# Patient Record
Sex: FEMALE | Race: ASIAN | NOT HISPANIC OR LATINO
[De-identification: names, ages, dates, MRNs, and addresses within clinical notes are randomized per-mention and may not be internally consistent; named-entity substitution may affect disease eponyms.]

---

## 2020-12-23 ENCOUNTER — RESULT REVIEW (OUTPATIENT)
Age: 40
End: 2020-12-23

## 2021-01-27 ENCOUNTER — APPOINTMENT (OUTPATIENT)
Dept: ENDOCRINOLOGY | Facility: CLINIC | Age: 41
End: 2021-01-27
Payer: COMMERCIAL

## 2021-01-27 VITALS
OXYGEN SATURATION: 99 % | DIASTOLIC BLOOD PRESSURE: 80 MMHG | HEART RATE: 105 BPM | SYSTOLIC BLOOD PRESSURE: 110 MMHG | HEIGHT: 62 IN | TEMPERATURE: 98.5 F | WEIGHT: 153 LBS | BODY MASS INDEX: 28.16 KG/M2

## 2021-01-27 DIAGNOSIS — Z83.49 FAMILY HISTORY OF OTHER ENDOCRINE, NUTRITIONAL AND METABOLIC DISEASES: ICD-10-CM

## 2021-01-27 DIAGNOSIS — Z78.9 OTHER SPECIFIED HEALTH STATUS: ICD-10-CM

## 2021-01-27 PROCEDURE — 99244 OFF/OP CNSLTJ NEW/EST MOD 40: CPT | Mod: 25

## 2021-01-27 PROCEDURE — 36415 COLL VENOUS BLD VENIPUNCTURE: CPT

## 2021-01-27 PROCEDURE — 99072 ADDL SUPL MATRL&STAF TM PHE: CPT

## 2021-02-02 ENCOUNTER — NON-APPOINTMENT (OUTPATIENT)
Age: 41
End: 2021-02-02

## 2021-02-15 NOTE — ADDENDUM
[FreeTextEntry1] : Ms. Chen was encouraged to continue close follow up with Dr. Egan and I will keep Dr. Jignesh márquez along the way.

## 2021-02-15 NOTE — PHYSICAL EXAM

## 2021-02-15 NOTE — HISTORY OF PRESENT ILLNESS
[FreeTextEntry1] : Ms. JAY  is a 40 year  year old female  who presents for initial endocrine evaluation.She presents via the courtesy of Dr. Judi Egan She presents with regard to a history of hypothyroidism. She is pregnant and was told that she is hypothyroid in this pregnancy . With her first child-almsot age 6 -told tsh not optimal for preg-put on LT4 in that preg-50 mcg-stopped the LT4 aftter delivery. Currently, she found out she is  preg in toward end of November and on her own started back on  L4 around end of December- toward the 11th week when TSH was  noted to be suboptimal.She is not sure of the value of the original tsh from several weeks ago.She has of late been taking the LT4,  50 mcg,  for about one month. Has a 9 year old too- both children born by  section.For prior preg had iui-this preg conceived naturally.\par Is on one asa given chronological age.  Too is on Prenatal vitamin-advised to take her LT4 away from her prenatal.\par Due date 08/04/2021\par Has noted mild intermittent fatigue.\par FH Mom just dx with hypoth at 64.\par \par \par

## 2021-03-04 ENCOUNTER — APPOINTMENT (OUTPATIENT)
Dept: ENDOCRINOLOGY | Facility: CLINIC | Age: 41
End: 2021-03-04
Payer: COMMERCIAL

## 2021-03-04 VITALS
HEART RATE: 98 BPM | DIASTOLIC BLOOD PRESSURE: 62 MMHG | WEIGHT: 157 LBS | OXYGEN SATURATION: 98 % | SYSTOLIC BLOOD PRESSURE: 118 MMHG | RESPIRATION RATE: 16 BRPM | BODY MASS INDEX: 28.72 KG/M2

## 2021-03-04 DIAGNOSIS — O99.280 ENDOCRINE, NUTRITIONAL AND METABOLIC DISEASES COMPLICATING PREGNANCY, UNSPECIFIED TRIMESTER: ICD-10-CM

## 2021-03-04 DIAGNOSIS — E05.90 ENDOCRINE, NUTRITIONAL AND METABOLIC DISEASES COMPLICATING PREGNANCY, UNSPECIFIED TRIMESTER: ICD-10-CM

## 2021-03-04 DIAGNOSIS — E55.9 VITAMIN D DEFICIENCY, UNSPECIFIED: ICD-10-CM

## 2021-03-04 DIAGNOSIS — O26.819 PREGNANCY RELATED EXHAUSTION AND FATIGUE, UNSPECIFIED TRIMESTER: ICD-10-CM

## 2021-03-04 DIAGNOSIS — E03.9 HYPOTHYROIDISM, UNSPECIFIED: ICD-10-CM

## 2021-03-04 PROCEDURE — 36415 COLL VENOUS BLD VENIPUNCTURE: CPT

## 2021-03-04 PROCEDURE — 99072 ADDL SUPL MATRL&STAF TM PHE: CPT

## 2021-03-04 PROCEDURE — 99214 OFFICE O/P EST MOD 30 MIN: CPT | Mod: 25

## 2021-03-07 PROBLEM — O99.280 MATERNAL HYPERTHYROIDISM: Status: ACTIVE | Noted: 2021-01-27

## 2021-03-07 NOTE — HISTORY OF PRESENT ILLNESS
[FreeTextEntry1] : Ms. JAY  is a 40 year  year old female  who returns  with regard to a history of hypothyroidism in pregnancy. Now 18 weeks gestation. She has of late been taking LT4,  50 mcg. Has a 9 year old too- both children born by C- section.For prior preg had iui-this preg conceived naturally.\par Is on one aspirin given chronological age.  Too is on Prenatal vitamin-advised to take her LT4 away from her prenatal.\par Has noted mild intermittent fatigue.\par D# low-now on D3 50,000 iu weekly.\par EDC: 08/04/2021 \par Feels well.\par Pt is not considering receiving COVID vaccine during pregnancy.

## 2021-03-18 ENCOUNTER — TRANSCRIPTION ENCOUNTER (OUTPATIENT)
Age: 41
End: 2021-03-18

## 2021-03-18 ENCOUNTER — APPOINTMENT (OUTPATIENT)
Dept: ANTEPARTUM | Facility: CLINIC | Age: 41
End: 2021-03-18
Payer: COMMERCIAL

## 2021-03-18 ENCOUNTER — ASOB RESULT (OUTPATIENT)
Age: 41
End: 2021-03-18

## 2021-03-18 PROCEDURE — 76811 OB US DETAILED SNGL FETUS: CPT

## 2021-03-18 PROCEDURE — 99072 ADDL SUPL MATRL&STAF TM PHE: CPT

## 2021-03-31 LAB
25(OH)D3 SERPL-MCNC: 21.1 NG/ML
25(OH)D3 SERPL-MCNC: 40.8 NG/ML
T3FREE SERPL-MCNC: 2.93 PG/ML
T3FREE SERPL-MCNC: 2.99 PG/ML
T4 FREE SERPL-MCNC: 1 NG/DL
T4 FREE SERPL-MCNC: 1.2 NG/DL
THYROGLOB AB SERPL-ACNC: <20 IU/ML
THYROGLOB AB SERPL-ACNC: <20 IU/ML
THYROPEROXIDASE AB SERPL IA-ACNC: 15.5 IU/ML
THYROPEROXIDASE AB SERPL IA-ACNC: <10 IU/ML
TSH SERPL-ACNC: 0.81 UIU/ML
TSH SERPL-ACNC: 1.18 UIU/ML

## 2021-04-13 ENCOUNTER — NON-APPOINTMENT (OUTPATIENT)
Age: 41
End: 2021-04-13

## 2021-04-13 LAB
T3FREE SERPL-MCNC: 2.39 PG/ML
T4 FREE SERPL-MCNC: 1 NG/DL
TSH SERPL-ACNC: 0.86 UIU/ML

## 2021-04-13 RX ORDER — LEVOTHYROXINE SODIUM 0.05 MG/1
50 TABLET ORAL DAILY
Qty: 90 | Refills: 0 | Status: ACTIVE | COMMUNITY
Start: 2021-03-04 | End: 1900-01-01

## 2021-04-13 RX ORDER — ERGOCALCIFEROL 1.25 MG/1
1.25 MG CAPSULE, LIQUID FILLED ORAL
Qty: 12 | Refills: 0 | Status: ACTIVE | COMMUNITY
Start: 2021-02-02 | End: 1900-01-01

## 2021-04-16 ENCOUNTER — TRANSCRIPTION ENCOUNTER (OUTPATIENT)
Age: 41
End: 2021-04-16

## 2021-04-26 ENCOUNTER — EMERGENCY (EMERGENCY)
Facility: HOSPITAL | Age: 41
LOS: 1 days | Discharge: HOME | End: 2021-04-26
Attending: EMERGENCY MEDICINE
Payer: COMMERCIAL

## 2021-04-26 VITALS
WEIGHT: 162.04 LBS | HEIGHT: 62 IN | DIASTOLIC BLOOD PRESSURE: 83 MMHG | SYSTOLIC BLOOD PRESSURE: 117 MMHG | RESPIRATION RATE: 18 BRPM | OXYGEN SATURATION: 97 % | HEART RATE: 104 BPM | TEMPERATURE: 99 F

## 2021-04-26 VITALS
DIASTOLIC BLOOD PRESSURE: 78 MMHG | OXYGEN SATURATION: 96 % | RESPIRATION RATE: 16 BRPM | TEMPERATURE: 98 F | SYSTOLIC BLOOD PRESSURE: 109 MMHG | HEART RATE: 96 BPM

## 2021-04-26 PROCEDURE — 99284 EMERGENCY DEPT VISIT MOD MDM: CPT

## 2021-04-26 NOTE — ED PROVIDER NOTE - PATIENT PORTAL LINK FT
You can access the FollowMyHealth Patient Portal offered by [facility] by registering at the following website: [email or web address]. By joining [facility]’s FollowMyHealth portal, you will also be able to view your health information using other applications (apps) compatible with our system.

## 2021-04-26 NOTE — ED PROVIDER NOTE - PROGRESS NOTE DETAILS
D/w OBGYN resident who reports she d/w chief and Robitussin PRN is ok for patient. OB to come down to ER for fetal monitoring prior to patient d/c. - Macrina Sebastian PA-C fetal monitoring still ongoing at bedside. - Macrina Sebastian PA-C PAOLA RN reports that 3rd year resident coming to do sono at bedside in ED, then pt likely to be d/c. - Macrina Sebastian PA-C PAOLA RN reports that resident is coming to do sono at bedside in ED, then pt likely to be d/c. - Macrina Sebastian PA-C Spoke with OBGYN resident, pt evaluated and stable for d/c to f/u in office. Confirms that Robitussin is ok for short term use. - NOEMI RamirezC

## 2021-04-26 NOTE — ED PROVIDER NOTE - OBJECTIVE STATEMENT
42yo F with no PMH,  @ 26 weeks presenting with cough x 2 weeks. Pt reports sore throat and cough began  follows by fevers for 2-3 days. Tested positive for COVID on 4/15. +persistent dry cough that has slightly improved since onset. When coughing feels head and abdominal pressure that resolves when coughing stops. Pt reports she quarantined for 10 days and went to return to work and was told that she cant's come back until cough is gone. Went to  for ?cough remedy but was told they can't monitor her pregnancy and advised to go to ER. Denies any fever/chills for over 6 days, CP, SOB, abdominal pain, vaginal bleeding, any other complaints. 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 Pt reports sore throat and cough began  followed by fevers for 2-3 days. Tested positive for COVID on 4/15. +persistent dry cough that has slightly improved since onset. When coughing, she feels head and abdominal pressure that resolves when coughing stops. Pt reports she quarantined for 10 days and went to return to work and was told that she can't come back until cough is gone. Went to  for ?cough remedy but was told they can't monitor her pregnancy and advised to go to ER. Denies any fever/chills for over 6 days, CP, SOB, abdominal pain, vaginal bleeding, n/v, any other complaints. 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.    PAOLA Egan

## 2021-04-26 NOTE — CHART NOTE - NSCHARTNOTEFT_GEN_A_CORE
Pt seen bedside of BPP evaluation. Only complains of cough otherwise feels well.    ICU Vital Signs Last 24 Hrs  T(C): 36.8 (26 Apr 2021 19:19), Max: 37.1 (26 Apr 2021 15:04)  T(F): 98.3 (26 Apr 2021 19:19), Max: 98.8 (26 Apr 2021 15:04)  HR: 99 (26 Apr 2021 19:19) (99 - 104)  BP: 108/78 (26 Apr 2021 19:19) (108/78 - 117/83)  BP(mean): --  ABP: --  ABP(mean): --  RR: 18 (26 Apr 2021 19:19) (18 - 18)  SpO2: 98% (26 Apr 2021 19:19) (97% - 98%)    NST: 150, mod luiz, - accels, - decels VANESSA   BPP: 8/8, breech, posterior placenta MVP5.2    Pt with no OB complaints at this time.  - f.u outpatient with Dr.Jacob nan Harmon PGY2

## 2021-04-26 NOTE — ED PROVIDER NOTE - ATTENDING CONTRIBUTION TO CARE
42yo F with no PMH,  @ 26 weeks, positive for COVID on 4/15.  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

## 2021-04-26 NOTE — ED PROVIDER NOTE - NSFOLLOWUPINSTRUCTIONS_ED_ALL_ED_FT
Rest. Stay well hydrated.   You may take over the counter Robitussin as directed.  Follow up with your OBGYN upon discharge for further evaluation and monitoring.     Please return to the Emergency Department immediately for any new, worsening, or concerning symptoms including chest pain, shortness of breath, worsening cough, abdominal pain, vaginal bleeding, or any other concerns. Rest. Stay well hydrated.   You may take over the counter Robitussin as directed *** do not take for more than 2-3 days and only take for severe cough symptoms.     Follow up with your OBGYN Dr. Egan upon discharge for further evaluation and monitoring.     Please return to the Emergency Department immediately for any new, worsening, or concerning symptoms including chest pain, shortness of breath, worsening cough, abdominal pain, vaginal bleeding, or any other concerns.

## 2021-04-26 NOTE — ED ADULT NURSE REASSESSMENT NOTE - NS ED NURSE REASSESS COMMENT FT1
1900: Report received from Stefany TOLENTINO.,  1920: OB RN at bedside to do FHR monitoring. Pt. resting comfortably in stretcher in no acute distress. HR slightly elevated ranging  bpm. Pt. is well appearing and denies any other complaints. VSS. Will continue to reassess.

## 2021-04-26 NOTE — ED ADULT NURSE REASSESSMENT NOTE - NS ED NURSE REASSESS COMMENT FT1
Pt resting comfortably with VSS, no complaints at this time. pending OB to present to bedside for fetal monitoring. plan of care discussed. safety and comfort measures maintained.

## 2021-04-26 NOTE — ED ADULT NURSE NOTE - OBJECTIVE STATEMENT
40 yo presents to the ED from home. A&OX4, ambulatory c/o cough since COVID. 4/11 symptoms onset. 4/15 test positive for COVID. persistent dry cough. denies CP. denies abd pain. pt reports attempting to go back to work but is unable to due to symptom persisting. pt is 26 weeks pregnant, 3rd pregnancy, no complications. Patient undressed and placed into gown, call bell in hand and side rails up for safety. warm blanket provided, vital signs stable, pt in no acute distress.

## 2021-07-08 ENCOUNTER — APPOINTMENT (OUTPATIENT)
Dept: ANTEPARTUM | Facility: CLINIC | Age: 41
End: 2021-07-08
Payer: COMMERCIAL

## 2021-07-08 ENCOUNTER — ASOB RESULT (OUTPATIENT)
Age: 41
End: 2021-07-08

## 2021-07-08 PROCEDURE — 76816 OB US FOLLOW-UP PER FETUS: CPT

## 2021-07-08 PROCEDURE — 99072 ADDL SUPL MATRL&STAF TM PHE: CPT

## 2021-07-08 PROCEDURE — 76818 FETAL BIOPHYS PROFILE W/NST: CPT

## 2021-07-12 ENCOUNTER — OUTPATIENT (OUTPATIENT)
Dept: OUTPATIENT SERVICES | Facility: HOSPITAL | Age: 41
LOS: 1 days | End: 2021-07-12
Payer: COMMERCIAL

## 2021-07-12 VITALS — TEMPERATURE: 98 F | RESPIRATION RATE: 14 BRPM

## 2021-07-12 VITALS — OXYGEN SATURATION: 97 % | TEMPERATURE: 99 F

## 2021-07-12 DIAGNOSIS — Z3A.00 WEEKS OF GESTATION OF PREGNANCY NOT SPECIFIED: ICD-10-CM

## 2021-07-12 DIAGNOSIS — O26.899 OTHER SPECIFIED PREGNANCY RELATED CONDITIONS, UNSPECIFIED TRIMESTER: ICD-10-CM

## 2021-07-12 LAB
APPEARANCE UR: ABNORMAL
BILIRUB UR-MCNC: NEGATIVE — SIGNIFICANT CHANGE UP
COLOR SPEC: YELLOW — SIGNIFICANT CHANGE UP
DIFF PNL FLD: ABNORMAL
GLUCOSE UR QL: NEGATIVE — SIGNIFICANT CHANGE UP
KETONES UR-MCNC: NEGATIVE — SIGNIFICANT CHANGE UP
LEUKOCYTE ESTERASE UR-ACNC: ABNORMAL
NITRITE UR-MCNC: NEGATIVE — SIGNIFICANT CHANGE UP
PH UR: 6 — SIGNIFICANT CHANGE UP (ref 5–8)
PROT UR-MCNC: ABNORMAL
SP GR SPEC: 1.02 — SIGNIFICANT CHANGE UP (ref 1.01–1.02)
UROBILINOGEN FLD QL: NEGATIVE — SIGNIFICANT CHANGE UP

## 2021-07-12 PROCEDURE — 59025 FETAL NON-STRESS TEST: CPT

## 2021-07-12 PROCEDURE — 81001 URINALYSIS AUTO W/SCOPE: CPT

## 2021-07-12 PROCEDURE — 87086 URINE CULTURE/COLONY COUNT: CPT

## 2021-07-12 PROCEDURE — G0463: CPT

## 2021-07-12 RX ORDER — OXYTOCIN 10 UNIT/ML
2 VIAL (ML) INJECTION
Qty: 30 | Refills: 0 | Status: DISCONTINUED | OUTPATIENT
Start: 2021-07-12 | End: 2021-07-27

## 2021-07-12 NOTE — OB PROVIDER TRIAGE NOTE - HISTORY OF PRESENT ILLNESS
42yo  @ 36w 3d presents c/o lower abdominal pain and groin pain which started this am at 11am.  Pt states the pain lasts for a couple of minutes and comes and goes.  Pt states the pain is really bad when she is ambulating or standing and its difficult to walk bc of the pain.  Pt denies vaginal bleeding or LOF has + FM.  Pt states she has urinary frequency which started today, denies pain with urination.  Pt last ate at 230p    PNC: Dr Egan  PNI: 1. h/o prior c/s x 2  PNL: GBS +    All: NKDA  MedS: PNV, Synthroid 50mcg  PMHx: hypothyroidism  PSHx: C/S x 2  OBhx: 2011  FT  C/S  arrest of descent  8lbs 7 oz  2016   FT  rC/S  9lbs 7oz  GYNhx: Denies fibroids, ov cysts  or STDs    T(C): 37.5 (07-12-21 @ 17:26), Max: 37.5 (07-12-21 @ 17:26)  HR: 101 (07-12-21 @ 17:43) (101 - 111)  BP: 118/67 (07-12-21 @ 17:24) (108/76 - 118/67)  RR: 18 (07-12-21 @ 16:15) (18 - 18)  SpO2: 95% (07-12-21 @ 17:43) (94% - 98%)    Gen: NAD  Heart: RRR  Lungs: CTA B/L  Abdomen: Gravid, NT  Ext: no calf tenderness    NST: 150's moderate variablity + accels no decels  TOCO: irregular  VE: 2/60/-3/soft  EFW: 6lbs 6oz as of last thursday    A/P 42yo  @ 36w 3 d with lower abdominal pain and groin pain since this am - R/O  PTL  - NST/TOCO  - UA/Urine culture  - repeat exam    Deena Hdez PA-C 40yo  @ 36w 3d presents c/o lower abdominal pain and groin pain which started this am at 11am.  Pt states the pain lasts for a couple of minutes and comes and goes.  Pt states the pain is really bad when she is ambulating or standing and its difficult to walk bc of the pain.  Pt denies vaginal bleeding or LOF has + FM.  Pt states she has urinary frequency which started today, denies pain with urination.  Pt last ate at 230p    PNC: Dr Egan  PNI: 1. h/o prior c/s x 2  2. covid + in 4/2021  PNL: GBS +    All: NKDA  MedS: PNV, Synthroid 50mcg  PMHx: hypothyroidism  PSHx: C/S x 2  OBhx: 2011  FT  C/S  arrest of descent  8lbs 7 oz  2016   FT  rC/S  9lbs 7oz  GYNhx: Denies fibroids, ov cysts  or STDs    T(C): 37.5 (07-12-21 @ 17:26), Max: 37.5 (07-12-21 @ 17:26)  HR: 101 (07-12-21 @ 17:43) (101 - 111)  BP: 118/67 (07-12-21 @ 17:24) (108/76 - 118/67)  RR: 18 (07-12-21 @ 16:15) (18 - 18)  SpO2: 95% (07-12-21 @ 17:43) (94% - 98%)    Gen: NAD  Heart: RRR  Lungs: CTA B/L  Abdomen: Gravid, NT  Ext: no calf tenderness    NST: 150's moderate variablity + accels no decels  TOCO: irregular  VE: 2/60/-3/soft  EFW: 6lbs 6oz as of last thursday    A/P 40yo  @ 36w 3 d with lower abdominal pain and groin pain since this am - R/O  PTL  - NST/TOCO  - UA/Urine culture  - repeat exam    Deena Hdez PA-C

## 2021-07-12 NOTE — OB PROVIDER TRIAGE NOTE - NSOBPROVIDERNOTE_OBGYN_ALL_OB_FT
A/P 40yo  @ 36w 3 d with lower abdominal pain and groin pain since this am - R/O  PTL  - NST/TOCO  - UA/Urine culture  - repeat exam    Deena Hdez PA-C

## 2021-07-14 LAB
CULTURE RESULTS: SIGNIFICANT CHANGE UP
SPECIMEN SOURCE: SIGNIFICANT CHANGE UP

## 2021-07-15 ENCOUNTER — OUTPATIENT (OUTPATIENT)
Dept: OUTPATIENT SERVICES | Facility: HOSPITAL | Age: 41
LOS: 1 days | End: 2021-07-15
Payer: COMMERCIAL

## 2021-07-15 ENCOUNTER — ASOB RESULT (OUTPATIENT)
Age: 41
End: 2021-07-15

## 2021-07-15 ENCOUNTER — APPOINTMENT (OUTPATIENT)
Dept: ANTEPARTUM | Facility: CLINIC | Age: 41
End: 2021-07-15
Payer: COMMERCIAL

## 2021-07-15 VITALS
HEART RATE: 102 BPM | TEMPERATURE: 98 F | RESPIRATION RATE: 14 BRPM | OXYGEN SATURATION: 96 % | HEIGHT: 62 IN | WEIGHT: 173.06 LBS | SYSTOLIC BLOOD PRESSURE: 99 MMHG | DIASTOLIC BLOOD PRESSURE: 65 MMHG

## 2021-07-15 DIAGNOSIS — Z01.818 ENCOUNTER FOR OTHER PREPROCEDURAL EXAMINATION: ICD-10-CM

## 2021-07-15 LAB
BLD GP AB SCN SERPL QL: NEGATIVE — SIGNIFICANT CHANGE UP
HCT VFR BLD CALC: 38.3 % — SIGNIFICANT CHANGE UP (ref 34.5–45)
HGB BLD-MCNC: 13.1 G/DL — SIGNIFICANT CHANGE UP (ref 11.5–15.5)
MCHC RBC-ENTMCNC: 32.6 PG — SIGNIFICANT CHANGE UP (ref 27–34)
MCHC RBC-ENTMCNC: 34.2 GM/DL — SIGNIFICANT CHANGE UP (ref 32–36)
MCV RBC AUTO: 95.3 FL — SIGNIFICANT CHANGE UP (ref 80–100)
NRBC # BLD: 0 /100 WBCS — SIGNIFICANT CHANGE UP (ref 0–0)
PLATELET # BLD AUTO: 166 K/UL — SIGNIFICANT CHANGE UP (ref 150–400)
RBC # BLD: 4.02 M/UL — SIGNIFICANT CHANGE UP (ref 3.8–5.2)
RBC # FLD: 14.5 % — SIGNIFICANT CHANGE UP (ref 10.3–14.5)
RH IG SCN BLD-IMP: POSITIVE — SIGNIFICANT CHANGE UP
WBC # BLD: 8.2 K/UL — SIGNIFICANT CHANGE UP (ref 3.8–10.5)
WBC # FLD AUTO: 8.2 K/UL — SIGNIFICANT CHANGE UP (ref 3.8–10.5)

## 2021-07-15 PROCEDURE — 86900 BLOOD TYPING SEROLOGIC ABO: CPT

## 2021-07-15 PROCEDURE — 99072 ADDL SUPL MATRL&STAF TM PHE: CPT

## 2021-07-15 PROCEDURE — G0463: CPT

## 2021-07-15 PROCEDURE — 85027 COMPLETE CBC AUTOMATED: CPT

## 2021-07-15 PROCEDURE — 86850 RBC ANTIBODY SCREEN: CPT

## 2021-07-15 PROCEDURE — 76818 FETAL BIOPHYS PROFILE W/NST: CPT

## 2021-07-15 PROCEDURE — 86901 BLOOD TYPING SEROLOGIC RH(D): CPT

## 2021-07-15 PROCEDURE — 87086 URINE CULTURE/COLONY COUNT: CPT

## 2021-07-15 RX ORDER — SODIUM CHLORIDE 9 MG/ML
1000 INJECTION, SOLUTION INTRAVENOUS
Refills: 0 | Status: DISCONTINUED | OUTPATIENT
Start: 2021-07-28 | End: 2021-07-28

## 2021-07-15 RX ORDER — FAMOTIDINE 10 MG/ML
20 INJECTION INTRAVENOUS ONCE
Refills: 0 | Status: COMPLETED | OUTPATIENT
Start: 2021-07-28 | End: 2021-07-28

## 2021-07-15 RX ORDER — CEFAZOLIN SODIUM 1 G
2000 VIAL (EA) INJECTION ONCE
Refills: 0 | Status: DISCONTINUED | OUTPATIENT
Start: 2021-07-28 | End: 2021-07-28

## 2021-07-15 RX ORDER — SODIUM CHLORIDE 9 MG/ML
1000 INJECTION, SOLUTION INTRAVENOUS ONCE
Refills: 0 | Status: COMPLETED | OUTPATIENT
Start: 2021-07-28 | End: 2021-07-28

## 2021-07-15 RX ORDER — OXYTOCIN 10 UNIT/ML
333.33 VIAL (ML) INJECTION
Qty: 20 | Refills: 0 | Status: DISCONTINUED | OUTPATIENT
Start: 2021-07-28 | End: 2021-07-28

## 2021-07-15 RX ORDER — CITRIC ACID/SODIUM CITRATE 300-500 MG
15 SOLUTION, ORAL ORAL ONCE
Refills: 0 | Status: COMPLETED | OUTPATIENT
Start: 2021-07-28 | End: 2021-07-28

## 2021-07-15 NOTE — OB PST NOTE - HISTORY OF PRESENT ILLNESS
40yo  @ 37+ weeks g 3d presents c/o lower abdominal pain and groin pain which  started this am at 11am. Pt states the pain lasts for a couple of minutes and  comes and goes. Pt states the pain is really bad when she is ambulating or  standing and its difficult to walk bc of the pain. Pt denies vaginal bleeding  or LOF has + FM.  Pt states she has urinary frequency which started today, denies pain with  urination.  Pt last ate at 230p    urine culture :Result >=3 organisms. Probable collection contamination.    42yo  IUP @ 37+ weeks gestation. PMH hypothyroid. covid19 4/2021 (no hospitalization).  PSH c/section x2.  pt on aspirin due to advanced maternal age.  presents to PST scheduled for repeat c/section on 7/28.  pt denies cough, fever, SOB, recent travel or exposure to confirmed covid cases. covid test scheduled on 7/25 at [facility].  pt's support fully vaccinated, will bring proof day of delivery.  5/2021 in HIE- positive fungal infection, pt stated she had yeast infection in May, she was treated and currently she's asymptomatic.  **7/12/2021, pt was admitted to [facility] ED c/o lower abdominal pain and groin pain, OB consulted, pt was having Lander Camargo, not actively in labor, no cervical dilation noted.  urine culture was sent at the time :Result >=3 organisms. Probable collection contamination. s/w  Arlet at Dr. Egan's office, repeat urine culture sent at [facility] today.  Arlet indicated Dr. Egan will follow up with urine culture result since she has [place] access.  Arlet verbalized understanding that PST will not follow up with urine culture result.***   40yo  IUP @ 37+ weeks gestation. PMH hypothyroid. covid19 4/2021 (no hospitalization).  PSH c/section x2.  pt on aspirin due to advanced maternal age.  presents to PST scheduled for repeat c/section on 7/28.  pt denies cough, fever, SOB, recent travel or exposure to confirmed covid cases. covid test scheduled on 7/25 at [facility].  pt's support fully vaccinated, will bring proof day of delivery.  Pt indicated she'll talk with Dr. Egan to discuss preop plan for aspirin.  5/2021 in HIE- positive fungal infection, pt stated she had yeast infection in May, she was treated and currently she's asymptomatic.  **7/12/2021, pt was admitted to [facility] ED c/o lower abdominal pain and groin pain, OB consulted, pt was having Marck Camargo, not actively in labor, no cervical dilation noted.  urine culture was sent at the time :Result >=3 organisms. Probable collection contamination. s/w  Arlet at Dr. Egan's office, repeat urine culture sent at [facility] today.  Arlet indicated Dr. Egan will follow up with urine culture result since she has [place] access.  Arlet verbalized understanding that PST will not follow up with urine culture result.***

## 2021-07-16 LAB
CULTURE RESULTS: SIGNIFICANT CHANGE UP
SPECIMEN SOURCE: SIGNIFICANT CHANGE UP

## 2021-07-22 ENCOUNTER — ASOB RESULT (OUTPATIENT)
Age: 41
End: 2021-07-22

## 2021-07-22 ENCOUNTER — APPOINTMENT (OUTPATIENT)
Dept: ANTEPARTUM | Facility: CLINIC | Age: 41
End: 2021-07-22
Payer: COMMERCIAL

## 2021-07-22 PROCEDURE — 76818 FETAL BIOPHYS PROFILE W/NST: CPT

## 2021-07-22 PROCEDURE — 99072 ADDL SUPL MATRL&STAF TM PHE: CPT

## 2021-07-25 ENCOUNTER — OUTPATIENT (OUTPATIENT)
Dept: OUTPATIENT SERVICES | Facility: HOSPITAL | Age: 41
LOS: 1 days | End: 2021-07-25
Payer: COMMERCIAL

## 2021-07-25 DIAGNOSIS — Z11.52 ENCOUNTER FOR SCREENING FOR COVID-19: ICD-10-CM

## 2021-07-25 LAB — SARS-COV-2 RNA SPEC QL NAA+PROBE: SIGNIFICANT CHANGE UP

## 2021-07-25 PROCEDURE — U0005: CPT

## 2021-07-25 PROCEDURE — U0003: CPT

## 2021-07-25 PROCEDURE — C9803: CPT

## 2021-07-27 ENCOUNTER — TRANSCRIPTION ENCOUNTER (OUTPATIENT)
Age: 41
End: 2021-07-27

## 2021-07-28 ENCOUNTER — INPATIENT (INPATIENT)
Facility: HOSPITAL | Age: 41
LOS: 1 days | Discharge: HOME | End: 2021-07-30
Attending: OBSTETRICS & GYNECOLOGY | Admitting: OBSTETRICS & GYNECOLOGY
Payer: COMMERCIAL

## 2021-07-28 VITALS
HEART RATE: 95 BPM | TEMPERATURE: 98 F | SYSTOLIC BLOOD PRESSURE: 111 MMHG | RESPIRATION RATE: 18 BRPM | DIASTOLIC BLOOD PRESSURE: 74 MMHG

## 2021-07-28 DIAGNOSIS — Z34.80 ENCOUNTER FOR SUPERVISION OF OTHER NORMAL PREGNANCY, UNSPECIFIED TRIMESTER: ICD-10-CM

## 2021-07-28 LAB
BLD GP AB SCN SERPL QL: NEGATIVE — SIGNIFICANT CHANGE UP
COVID-19 SPIKE DOMAIN AB INTERP: POSITIVE
COVID-19 SPIKE DOMAIN ANTIBODY RESULT: 154 U/ML — HIGH
RH IG SCN BLD-IMP: POSITIVE — SIGNIFICANT CHANGE UP
SARS-COV-2 IGG+IGM SERPL QL IA: 154 U/ML — HIGH
SARS-COV-2 IGG+IGM SERPL QL IA: POSITIVE
T PALLIDUM AB TITR SER: NEGATIVE — SIGNIFICANT CHANGE UP

## 2021-07-28 PROCEDURE — 88302 TISSUE EXAM BY PATHOLOGIST: CPT | Mod: 26

## 2021-07-28 PROCEDURE — 59514 CESAREAN DELIVERY ONLY: CPT | Mod: AS,U9

## 2021-07-28 PROCEDURE — 58611 LIGATE OVIDUCT(S) ADD-ON: CPT | Mod: AS

## 2021-07-28 RX ORDER — NALOXONE HYDROCHLORIDE 4 MG/.1ML
0.1 SPRAY NASAL
Refills: 0 | Status: DISCONTINUED | OUTPATIENT
Start: 2021-07-28 | End: 2021-07-30

## 2021-07-28 RX ORDER — KETOROLAC TROMETHAMINE 30 MG/ML
30 SYRINGE (ML) INJECTION EVERY 6 HOURS
Refills: 0 | Status: DISCONTINUED | OUTPATIENT
Start: 2021-07-28 | End: 2021-07-29

## 2021-07-28 RX ORDER — SODIUM CHLORIDE 9 MG/ML
1000 INJECTION, SOLUTION INTRAVENOUS
Refills: 0 | Status: DISCONTINUED | OUTPATIENT
Start: 2021-07-28 | End: 2021-07-30

## 2021-07-28 RX ORDER — OXYTOCIN 10 UNIT/ML
333.33 VIAL (ML) INJECTION
Qty: 20 | Refills: 0 | Status: DISCONTINUED | OUTPATIENT
Start: 2021-07-28 | End: 2021-07-30

## 2021-07-28 RX ORDER — ONDANSETRON 8 MG/1
4 TABLET, FILM COATED ORAL EVERY 6 HOURS
Refills: 0 | Status: DISCONTINUED | OUTPATIENT
Start: 2021-07-28 | End: 2021-07-30

## 2021-07-28 RX ORDER — OXYCODONE HYDROCHLORIDE 5 MG/1
5 TABLET ORAL ONCE
Refills: 0 | Status: DISCONTINUED | OUTPATIENT
Start: 2021-07-28 | End: 2021-07-30

## 2021-07-28 RX ORDER — OXYCODONE HYDROCHLORIDE 5 MG/1
5 TABLET ORAL
Refills: 0 | Status: DISCONTINUED | OUTPATIENT
Start: 2021-07-28 | End: 2021-07-30

## 2021-07-28 RX ORDER — DEXAMETHASONE 0.5 MG/5ML
4 ELIXIR ORAL EVERY 6 HOURS
Refills: 0 | Status: DISCONTINUED | OUTPATIENT
Start: 2021-07-28 | End: 2021-07-30

## 2021-07-28 RX ORDER — HEPARIN SODIUM 5000 [USP'U]/ML
5000 INJECTION INTRAVENOUS; SUBCUTANEOUS EVERY 12 HOURS
Refills: 0 | Status: DISCONTINUED | OUTPATIENT
Start: 2021-07-28 | End: 2021-07-30

## 2021-07-28 RX ORDER — SIMETHICONE 80 MG/1
80 TABLET, CHEWABLE ORAL EVERY 4 HOURS
Refills: 0 | Status: DISCONTINUED | OUTPATIENT
Start: 2021-07-28 | End: 2021-07-29

## 2021-07-28 RX ORDER — LEVOTHYROXINE SODIUM 125 MCG
50 TABLET ORAL DAILY
Refills: 0 | Status: DISCONTINUED | OUTPATIENT
Start: 2021-07-28 | End: 2021-07-30

## 2021-07-28 RX ORDER — MAGNESIUM HYDROXIDE 400 MG/1
30 TABLET, CHEWABLE ORAL
Refills: 0 | Status: DISCONTINUED | OUTPATIENT
Start: 2021-07-28 | End: 2021-07-30

## 2021-07-28 RX ORDER — OXYCODONE HYDROCHLORIDE 5 MG/1
5 TABLET ORAL
Refills: 0 | Status: DISCONTINUED | OUTPATIENT
Start: 2021-07-28 | End: 2021-07-28

## 2021-07-28 RX ORDER — LANOLIN
1 OINTMENT (GRAM) TOPICAL EVERY 6 HOURS
Refills: 0 | Status: DISCONTINUED | OUTPATIENT
Start: 2021-07-28 | End: 2021-07-30

## 2021-07-28 RX ORDER — MORPHINE SULFATE 50 MG/1
0.1 CAPSULE, EXTENDED RELEASE ORAL ONCE
Refills: 0 | Status: DISCONTINUED | OUTPATIENT
Start: 2021-07-28 | End: 2021-07-29

## 2021-07-28 RX ORDER — IBUPROFEN 200 MG
600 TABLET ORAL EVERY 6 HOURS
Refills: 0 | Status: COMPLETED | OUTPATIENT
Start: 2021-07-28 | End: 2022-06-26

## 2021-07-28 RX ORDER — ACETAMINOPHEN 500 MG
975 TABLET ORAL
Refills: 0 | Status: DISCONTINUED | OUTPATIENT
Start: 2021-07-28 | End: 2021-07-30

## 2021-07-28 RX ORDER — SIMETHICONE 80 MG/1
80 TABLET, CHEWABLE ORAL EVERY 4 HOURS
Refills: 0 | Status: DISCONTINUED | OUTPATIENT
Start: 2021-07-29 | End: 2021-07-30

## 2021-07-28 RX ORDER — NALBUPHINE HYDROCHLORIDE 10 MG/ML
2.5 INJECTION, SOLUTION INTRAMUSCULAR; INTRAVENOUS; SUBCUTANEOUS EVERY 6 HOURS
Refills: 0 | Status: DISCONTINUED | OUTPATIENT
Start: 2021-07-28 | End: 2021-07-30

## 2021-07-28 RX ORDER — DIPHENHYDRAMINE HCL 50 MG
25 CAPSULE ORAL EVERY 6 HOURS
Refills: 0 | Status: DISCONTINUED | OUTPATIENT
Start: 2021-07-28 | End: 2021-07-30

## 2021-07-28 RX ORDER — TETANUS TOXOID, REDUCED DIPHTHERIA TOXOID AND ACELLULAR PERTUSSIS VACCINE, ADSORBED 5; 2.5; 8; 8; 2.5 [IU]/.5ML; [IU]/.5ML; UG/.5ML; UG/.5ML; UG/.5ML
0.5 SUSPENSION INTRAMUSCULAR ONCE
Refills: 0 | Status: DISCONTINUED | OUTPATIENT
Start: 2021-07-28 | End: 2021-07-30

## 2021-07-28 RX ORDER — CEFAZOLIN SODIUM 1 G
2000 VIAL (EA) INJECTION EVERY 8 HOURS
Refills: 0 | Status: COMPLETED | OUTPATIENT
Start: 2021-07-28 | End: 2021-07-29

## 2021-07-28 RX ADMIN — FAMOTIDINE 20 MILLIGRAM(S): 10 INJECTION INTRAVENOUS at 16:32

## 2021-07-28 RX ADMIN — SODIUM CHLORIDE 2000 MILLILITER(S): 9 INJECTION, SOLUTION INTRAVENOUS at 15:10

## 2021-07-28 RX ADMIN — Medication 15 MILLILITER(S): at 16:32

## 2021-07-28 NOTE — OB PROVIDER DELIVERY SUMMARY - NSSELHIDDEN_OBGYN_ALL_OB_FT
[NS_DeliveryAttending1_OBGYN_ALL_OB_FT:[email or web address]],[NS_DeliveryAssist1_OBGYN_ALL_OB_FT:[id number]]

## 2021-07-28 NOTE — OB RN DELIVERY SUMMARY - NS_DELIVERYATTENDING1_OBGYN_ALL_OB_FT
Meloxicam Oral capsule  What is this medicine?  MELOXICAM (shaye OX i cam) is a non-steroidal anti-inflammatory drug (NSAID). It is used to reduce swelling and to treat pain. It is used for osteoarthritis.  This medicine may be used for other purposes; ask your health care provider or pharmacist if you have questions.  What should I tell my health care provider before I take this medicine?  They need to know if you have any of these conditions:  · bleeding disorders  · cigarette smoker  · coronary artery bypass graft (CABG) surgery within the past 2 weeks  · drink more than 3 alcohol-containing drinks per day  · heart disease  · high blood pressure  · history of stomach bleeding  · kidney disease  · liver disease  · lung or breathing disease, like asthma  · stomach or intestine problems  · an unusual or allergic reaction to meloxicam, aspirin, other NSAIDs, other medicines, foods, dyes, or preservatives  · pregnant or trying to get pregnant  · breast-feeding  How should I use this medicine?  Take this medicine by mouth with a full glass of water. Follow the directions on the prescription label. You can take it with or without food. If it upsets your stomach, take it with food. Take your medicine at regular intervals. Do not take it more often than directed. Do not stop taking except on your doctor's advice.  A special MedGuide will be given to you by the pharmacist with each prescription and refill. Be sure to read this information carefully each time.  Talk to your pediatrician regarding the use of this medicine in children. Special care may be needed.  Patients over 65 years old may have a stronger reaction and need a smaller dose.  Overdosage: If you think you have taken too much of this medicine contact a poison control center or emergency room at once.  NOTE: This medicine is only for you. Do not share this medicine with others.  What if I miss a dose?  If you miss a dose, take it as soon as you can. If it is  almost time for your next dose, take only that dose. Do not take double or extra doses.  What may interact with this medicine?  Do not take this medicine with any of the following medications:  · cidofovir  · ketorolac  This medicine may also interact with the following medications:  · aspirin and aspirin-like medicines  · certain medicines for blood pressure, heart disease, irregular heart beat  · certain medicines for depression, anxiety, or psychotic disturbances  · certain medicines that treat or prevent blood clots like warfarin, enoxaparin, dalteparin, apixaban, dabigatran, rivaroxaban  · cyclosporine  · digoxin  · diuretics  · methotrexate  · other NSAIDs, medicines for pain and inflammation, like ibuprofen and naproxen  · pemetrexed  This list may not describe all possible interactions. Give your health care provider a list of all the medicines, herbs, non-prescription drugs, or dietary supplements you use. Also tell them if you smoke, drink alcohol, or use illegal drugs. Some items may interact with your medicine.  What should I watch for while using this medicine?  Tell your doctor or healthcare professional if your symptoms do not start to get better or if they get worse.  Do not take other medicines that contain aspirin, ibuprofen, or naproxen with this medicine. Side effects such as stomach upset, nausea, or ulcers may be more likely to occur. Many medicines available without a prescription should not be taken with this medicine.  This medicine can cause ulcers and bleeding in the stomach and intestines at any time during treatment. This can happen with no warning and may cause death. There is increased risk with taking this medicine for a long time. Smoking, drinking alcohol, older age, and poor health can also increase risks. Call your doctor right away if you have stomach pain or blood in your vomit or stool.  This medicine does not prevent heart attack or stroke. In fact, this medicine may increase  the chance of a heart attack or stroke. The chance may increase with longer use of this medicine and in people who have heart disease. If you take aspirin to prevent heart attack or stroke, talk with your doctor or health care professional.  What side effects may I notice from receiving this medicine?  Side effects that you should report to your doctor or health care professional as soon as possible:  · allergic reactions like skin rash, itching or hives, swelling of the face, lips, or tongue  · nausea, vomiting  · signs and symptoms of a blood clot such as breathing problems; changes in vision; chest pain; severe, sudden headache; pain, swelling, warmth in the leg; trouble speaking; sudden numbness or weakness of the face, arm, or leg  · signs and symptoms of bleeding such as bloody or black, tarry stools; red or dark-brown urine; spitting up blood or brown material that looks like coffee grounds; red spots on the skin; unusual bruising or bleeding from the eye, gums, or nose  · signs and symptoms of liver injury like dark yellow or brown urine; general ill feeling or flu-like symptoms; light-colored stools; loss of appetite; nausea; right upper belly pain; unusually weak or tired; yellowing of the eyes or skin  · signs and symptoms of stroke like changes in vision; confusion; trouble speaking or understanding; severe headaches; sudden numbness or weakness of the face, arm, or leg; trouble walking; dizziness; loss of balance or coordination  Side effects that usually do not require medical attention (report these to your doctor or health care professional if they continue or are bothersome):  · constipation  · diarrhea  · gas  This list may not describe all possible side effects. Call your doctor for medical advice about side effects. You may report side effects to FDA at 6-480-FDA-9164.  Where should I keep my medicine?  Keep out of the reach of children.  Store at room temperature between 15 and 30 degrees C (59  and 86 degrees F). Throw away any unused medicine after the expiration date.  NOTE: This sheet is a summary. It may not cover all possible information. If you have questions about this medicine, talk to your doctor, pharmacist, or health care provider.  NOTE:This sheet is a summary. It may not cover all possible information. If you have questions about this medicine, talk to your doctor, pharmacist, or health care provider. Copyright© 2016 [employer]              MEDICATION: AUGMENTIN  Augmentin (generic: amoxicillin + clavulanate) is a penicillin-type antibiotic.  DIRECTIONS FOR USE:  Take Augmentin with food to avoid stomach upset.  Take the medicine at regular intervals. If the label says “every 8 hours”, this means 3 times per day. Doses don't have to be exactly eight hours apart, but you should take three doses a day, in the morning, afternoon and at bedtime. Take all of the medicine until it is gone, even if you are feeling better. This will assure that the infection is fully treated.  WHAT TO WATCH FOR:  POSSIBLE SIDE EFFECTS: Nausea, diarrhea, anxiety, dizziness: Contact your doctor if any of these symptoms persist or become severe. White spots in the mouth (thrush), vaginal itching or discharge: Contact your doctor.  ALLERGIC REACTION: Rash, itching, swelling, trouble breathing or swallowing: Contact your doctor or return to this facility promptly.  MEDICAL CONDITIONS: Before starting this medicine, be sure your doctor knows if you have any of the following conditions:  · Allergic reaction to cephalosporin or penicillin-type drugs in the past  · Current infection with mononucleosis  · Breastfeeding  DRUG INTERACTION: Before starting this medicine, be sure your doctor knows if you are taking any of the following drugs:  · Allopurinol, Probenecid, methotrexate, birth control pills (see below)  WARNING:  · In rare cases, this medicine may block the effect of birth control pills. Therefore, to be safe, use  another form of contraception during the menstrual cycle(s) in which you are taking this medicine.  · Do not drive, ride a bicycle or operate dangerous equipment while taking this medicine until you know how it will affect you.  [NOTE: This information topic may not include all directions, precautions, medical conditions, drug/food interactions and warnings for this drug. Check with your doctor, nurse, or pharmacist for any questions that you may have.]  © 7936-9807 [employer] [facility], [address] [place] [address], [place] [address]. All rights reserved. This information is not intended as a substitute for professional medical care. Always follow your healthcare professional's instructions.   Judi Egan MD

## 2021-07-28 NOTE — OB RN INTRAOPERATIVE NOTE - NSSELHIDDEN_OBGYN_ALL_OB_FT
[NS_DeliveryAttending1_OBGYN_ALL_OB_FT:[email or web address]],[NS_DeliveryAssist1_OBGYN_ALL_OB_FT:[id number]==],[NS_DeliveryRN_OBGYN_ALL_OB_FT:[id number]]

## 2021-07-28 NOTE — OB RN DELIVERY SUMMARY - NS_SEPSISRSKCALC_OBGYN_ALL_OB_FT
EOS calculated successfully. EOS Risk Factor: 0.5/1000 live births ([facility] national incidence); GA=39w;Temp=98.4; ROM=0.033; GBS='Positive'; Antibiotics='No antibiotics or any antibiotics < 2 hrs prior to birth'

## 2021-07-28 NOTE — OB PROVIDER H&P - ASSESSMENT
42yo  EDC 21 @ 39 weeks for scheduled rltcs+bs  admit  efm/toco  ivf  routine labs/Covid swab  preop for ltcs  anesthesia consult  Dr Jignesh Álvarez PAC

## 2021-07-28 NOTE — OB PROVIDER H&P - NSHPPHYSICALEXAM_GEN_ALL_CORE
T(C): 36.8 (07-28-21 @ 14:37), Max: 36.8 (07-28-21 @ 14:37)  HR: 95 (07-28-21 @ 14:37) (95 - 95)  BP: 111/74 (07-28-21 @ 14:37) (111/74 - 111/74)  RR: 18 (07-28-21 @ 14:37) (18 - 18)  SpO2: --  GEN:NAD  CV:RRR  Pulm: CTA bl  Abd soft NT gravid  FHT:  140   , mod luiz, + accels, - decels   TOCO:  q2-5m  VE: deferred

## 2021-07-28 NOTE — OB PROVIDER H&P - HISTORY OF PRESENT ILLNESS
40yo  IUP @ 37+ weeks gestation. PMH hypothyroid. covid19 2021 (no hospitalization).  PSH c/section x2.  pt on aspirin due to advanced maternal age.  presents to PST scheduled for repeat c/section on .  pt denies cough, fever, SOB, recent travel or exposure to confirmed covid cases. covid test scheduled on  at [facility].  pt's support fully vaccinated, will bring proof day of delivery.  Pt indicated she'll talk with Dr. Egan to discuss preop plan for aspirin.  2021 in [facility]- positive fungal infection, pt stated she had yeast infection in May, she was treated and currently she's asymptomatic.  **2021, pt was admitted to [facility] ED c/o lower abdominal pain and groin pain, OB consulted, pt was having Marck Camargo, not actively in labor, no cervical dilation noted.  urine culture was sent at the time :Result >=3 organisms. Probable collection contamination. s/w  Arlet at Dr. Egan's office, repeat urine culture sent at [facility] today.  Arlet indicated Dr. Egan will follow up with urine culture result since she has [place] access.  Arlet verbalized understanding that PST will not follow up with urine culture result.***      PA Admit Note  40yo  EDC 21@ 39 weeks for scheduled rltcs with Bilateral Salpingectomy. Denies ctx  lof  vb  +FM  GBS  positive  EFW 4100  PNC C/b gestational hypothyroid    OB:  36w pltcs arrest of descent 8#7  2016 38w rltcs 9#6  GYN:Denies fibroids/ovarian cysts/STI's/abnl pap  PMH: none  PSH: c/sx2  MEDS: Synthroid 50mcg, ASA PNV, Vit C  ALL:NKDA  Accepts blood. Denies h/o anxiety/depression.

## 2021-07-28 NOTE — OB RN PATIENT PROFILE - ALERT: PERTINENT HISTORY
Patient desires tubal ligation/1st Trimester Sonogram/20 Week Level II Sonogram/BioPhysical Profile(s)/Non Invasive Prenatal Screen (NIPS)/Fetal Non-Stress Test (NST)/Ultra Screen at 12 Weeks

## 2021-07-28 NOTE — OB RN DELIVERY SUMMARY - NSSELHIDDEN_OBGYN_ALL_OB_FT
[NS_DeliveryAttending1_OBGYN_ALL_OB_FT:[email or web address]=],[NS_DeliveryAssist1_OBGYN_ALL_OB_FT:[id number]==],[NS_DeliveryRN_OBGYN_ALL_OB_FT:[id number]==]

## 2021-07-28 NOTE — OB PROVIDER H&P - NSTRANFUSIONOBJECTION_GEN_ALL_CORE_SIUH
[address] [address]  Phone: [phone]  Fax: [phone]    Penelope Brooke MD        March 23, 2018     Patient: Hamilton Polk   YOB: 1983   Date of Visit: 3/23/2018       To Whom it May Concern:    Hamilton Polk was seen in my clinic on 3/23/2018. She should remain out of work for 1 month until she completes Physical Therapy. If you have any questions or concerns, please don't hesitate to call.     Sincerely,         Penelope Brooke MD Patient has no objection to blood transfusions.

## 2021-07-28 NOTE — OB PROVIDER DELIVERY SUMMARY - NSPROVIDERDELIVERYNOTE_OBGYN_ALL_OB_FT
repeat low transverse  section with bilateral tubal ligation of viable male infant. apgars 9,9. Hysterotomy closed in 1 layer with caprosyn. Grossly normal uterus, tubes, ovaries.   EBL 700ml  IVF 2000ml  UO 325ml

## 2021-07-29 LAB
BASOPHILS # BLD AUTO: 0.01 K/UL — SIGNIFICANT CHANGE UP (ref 0–0.2)
BASOPHILS NFR BLD AUTO: 0.1 % — SIGNIFICANT CHANGE UP (ref 0–2)
EOSINOPHIL # BLD AUTO: 0.05 K/UL — SIGNIFICANT CHANGE UP (ref 0–0.5)
EOSINOPHIL NFR BLD AUTO: 0.6 % — SIGNIFICANT CHANGE UP (ref 0–6)
HCT VFR BLD CALC: 33.3 % — LOW (ref 34.5–45)
HGB BLD-MCNC: 11.4 G/DL — LOW (ref 11.5–15.5)
IMM GRANULOCYTES NFR BLD AUTO: 0.4 % — SIGNIFICANT CHANGE UP (ref 0–1.5)
LYMPHOCYTES # BLD AUTO: 1.11 K/UL — SIGNIFICANT CHANGE UP (ref 1–3.3)
LYMPHOCYTES # BLD AUTO: 13.9 % — SIGNIFICANT CHANGE UP (ref 13–44)
MCHC RBC-ENTMCNC: 33.1 PG — SIGNIFICANT CHANGE UP (ref 27–34)
MCHC RBC-ENTMCNC: 34.2 GM/DL — SIGNIFICANT CHANGE UP (ref 32–36)
MCV RBC AUTO: 96.8 FL — SIGNIFICANT CHANGE UP (ref 80–100)
MONOCYTES # BLD AUTO: 0.46 K/UL — SIGNIFICANT CHANGE UP (ref 0–0.9)
MONOCYTES NFR BLD AUTO: 5.7 % — SIGNIFICANT CHANGE UP (ref 2–14)
NEUTROPHILS # BLD AUTO: 6.35 K/UL — SIGNIFICANT CHANGE UP (ref 1.8–7.4)
NEUTROPHILS NFR BLD AUTO: 79.3 % — HIGH (ref 43–77)
NRBC # BLD: 0 /100 WBCS — SIGNIFICANT CHANGE UP (ref 0–0)
PLATELET # BLD AUTO: 129 K/UL — LOW (ref 150–400)
RBC # BLD: 3.44 M/UL — LOW (ref 3.8–5.2)
RBC # FLD: 14.3 % — SIGNIFICANT CHANGE UP (ref 10.3–14.5)
WBC # BLD: 8.01 K/UL — SIGNIFICANT CHANGE UP (ref 3.8–10.5)
WBC # FLD AUTO: 8.01 K/UL — SIGNIFICANT CHANGE UP (ref 3.8–10.5)

## 2021-07-29 RX ORDER — IBUPROFEN 200 MG
600 TABLET ORAL EVERY 6 HOURS
Refills: 0 | Status: DISCONTINUED | OUTPATIENT
Start: 2021-07-29 | End: 2021-07-30

## 2021-07-29 RX ADMIN — HEPARIN SODIUM 5000 UNIT(S): 5000 INJECTION INTRAVENOUS; SUBCUTANEOUS at 00:32

## 2021-07-29 RX ADMIN — Medication 975 MILLIGRAM(S): at 09:20

## 2021-07-29 RX ADMIN — HEPARIN SODIUM 5000 UNIT(S): 5000 INJECTION INTRAVENOUS; SUBCUTANEOUS at 12:11

## 2021-07-29 RX ADMIN — Medication 50 MICROGRAM(S): at 06:19

## 2021-07-29 RX ADMIN — SIMETHICONE 80 MILLIGRAM(S): 80 TABLET, CHEWABLE ORAL at 20:50

## 2021-07-29 RX ADMIN — Medication 30 MILLIGRAM(S): at 01:05

## 2021-07-29 RX ADMIN — Medication 975 MILLIGRAM(S): at 20:50

## 2021-07-29 RX ADMIN — Medication 600 MILLIGRAM(S): at 17:03

## 2021-07-29 RX ADMIN — Medication 100 MILLIGRAM(S): at 00:32

## 2021-07-29 RX ADMIN — Medication 100 MILLIGRAM(S): at 09:21

## 2021-07-29 RX ADMIN — Medication 975 MILLIGRAM(S): at 22:09

## 2021-07-29 RX ADMIN — Medication 975 MILLIGRAM(S): at 17:01

## 2021-07-29 RX ADMIN — Medication 30 MILLIGRAM(S): at 00:32

## 2021-07-29 RX ADMIN — Medication 30 MILLIGRAM(S): at 12:11

## 2021-07-29 RX ADMIN — Medication 30 MILLIGRAM(S): at 06:19

## 2021-07-29 RX ADMIN — Medication 975 MILLIGRAM(S): at 15:15

## 2021-07-30 ENCOUNTER — TRANSCRIPTION ENCOUNTER (OUTPATIENT)
Age: 41
End: 2021-07-30

## 2021-07-30 VITALS
TEMPERATURE: 98 F | DIASTOLIC BLOOD PRESSURE: 71 MMHG | HEART RATE: 85 BPM | RESPIRATION RATE: 17 BRPM | OXYGEN SATURATION: 95 % | SYSTOLIC BLOOD PRESSURE: 110 MMHG

## 2021-07-30 PROCEDURE — 88302 TISSUE EXAM BY PATHOLOGIST: CPT

## 2021-07-30 PROCEDURE — 86850 RBC ANTIBODY SCREEN: CPT

## 2021-07-30 PROCEDURE — 86769 SARS-COV-2 COVID-19 ANTIBODY: CPT

## 2021-07-30 PROCEDURE — 86900 BLOOD TYPING SEROLOGIC ABO: CPT

## 2021-07-30 PROCEDURE — 86901 BLOOD TYPING SEROLOGIC RH(D): CPT

## 2021-07-30 PROCEDURE — 85025 COMPLETE CBC W/AUTO DIFF WBC: CPT

## 2021-07-30 PROCEDURE — 86780 TREPONEMA PALLIDUM: CPT

## 2021-07-30 PROCEDURE — 59050 FETAL MONITOR W/REPORT: CPT

## 2021-07-30 PROCEDURE — 59025 FETAL NON-STRESS TEST: CPT

## 2021-07-30 RX ORDER — IBUPROFEN 200 MG
1 TABLET ORAL
Qty: 0 | Refills: 0 | DISCHARGE
Start: 2021-07-30

## 2021-07-30 RX ORDER — ACETAMINOPHEN 500 MG
3 TABLET ORAL
Qty: 0 | Refills: 0 | DISCHARGE
Start: 2021-07-30

## 2021-07-30 RX ADMIN — SIMETHICONE 80 MILLIGRAM(S): 80 TABLET, CHEWABLE ORAL at 02:47

## 2021-07-30 RX ADMIN — Medication 600 MILLIGRAM(S): at 01:00

## 2021-07-30 RX ADMIN — Medication 975 MILLIGRAM(S): at 03:30

## 2021-07-30 RX ADMIN — Medication 600 MILLIGRAM(S): at 00:00

## 2021-07-30 RX ADMIN — Medication 600 MILLIGRAM(S): at 06:45

## 2021-07-30 RX ADMIN — HEPARIN SODIUM 5000 UNIT(S): 5000 INJECTION INTRAVENOUS; SUBCUTANEOUS at 00:02

## 2021-07-30 RX ADMIN — Medication 975 MILLIGRAM(S): at 09:16

## 2021-07-30 RX ADMIN — Medication 975 MILLIGRAM(S): at 02:48

## 2021-07-30 RX ADMIN — Medication 50 MICROGRAM(S): at 06:55

## 2021-07-30 RX ADMIN — Medication 975 MILLIGRAM(S): at 10:00

## 2021-07-30 RX ADMIN — Medication 600 MILLIGRAM(S): at 05:05

## 2021-07-30 NOTE — DISCHARGE NOTE OB - CARE PROVIDER_API CALL
Judi Egan  OBSTETRICS AND GYNECOLOGY  [address], [address]  [place] [address]  Phone: [phone]  Fax: [phone]  Established Patient  Follow Up Time:

## 2021-07-30 NOTE — PROGRESS NOTE ADULT - ASSESSMENT
41y  POD # 2 S/P  repeat  section, doing well
 41 y.o. G 3 P  3003     POD # 1 S/P Rpt. C/S with BTL in stable condition.

## 2021-07-30 NOTE — DISCHARGE NOTE OB - CARE PLAN
Principal Discharge DX:	 delivery delivered  Goal:	Recovery  Assessment and plan of treatment:	Return to baseline health, regular diet, pain controlled, f/u with Dr. Egan.

## 2021-07-30 NOTE — DISCHARGE NOTE OB - HOSPITAL COURSE
Patient status post  . Uncomplicated recovery F/u with Dr. Egan.  Patient status post C/S. Uncomplicated recovery F/u with Dr. Egan.

## 2021-07-30 NOTE — DISCHARGE NOTE OB - PATIENT PORTAL LINK FT
You can access the FollowMyHealth Patient Portal offered by [facility] by registering at the following website: [email or web address]. By joining its learning’s FollowMyHealth portal, you will also be able to view your health information using other applications (apps) compatible with our system.

## 2021-07-30 NOTE — DISCHARGE NOTE OB - MEDICATION SUMMARY - MEDICATIONS TO TAKE
I will START or STAY ON the medications listed below when I get home from the hospital:    acetaminophen 325 mg oral tablet  -- 3 tab(s) by mouth   -- Indication: For mild pain     ibuprofen 600 mg oral tablet  -- 1 tab(s) by mouth every 6 hours  -- Indication: For Cramping

## 2021-07-30 NOTE — PROGRESS NOTE ADULT - SUBJECTIVE AND OBJECTIVE BOX
Day 1 of Anesthesia Pain Management Service    SUBJECTIVE: Doing ok  Pain Scale Score:          [X] Refer to charted pain scores    THERAPY:    s/p   100mcg PF morphine on 7\28\2021      MEDICATIONS  (STANDING):  acetaminophen   Tablet .. 975 milliGRAM(s) Oral <User Schedule>  ceFAZolin   IVPB 2000 milliGRAM(s) IV Intermittent every 8 hours  diphtheria/tetanus/pertussis (acellular) Vaccine (ADAcel) 0.5 milliLiter(s) IntraMuscular once  heparin   Injectable 5000 Unit(s) SubCutaneous every 12 hours  ibuprofen  Tablet. 600 milliGRAM(s) Oral every 6 hours  ketorolac   Injectable 30 milliGRAM(s) IV Push every 6 hours  lactated ringers. 1000 milliLiter(s) (125 mL/Hr) IV Continuous <Continuous>  levothyroxine 50 MICROGram(s) Oral daily  morphine PF Spinal 0.1 milliGRAM(s) IntraThecal. once  oxytocin Infusion 333.333 milliUNIT(s)/Min (1000 mL/Hr) IV Continuous <Continuous>    MEDICATIONS  (PRN):  dexAMETHasone  Injectable 4 milliGRAM(s) IV Push every 6 hours PRN Nausea  diphenhydrAMINE 25 milliGRAM(s) Oral every 6 hours PRN Pruritus  lanolin Ointment 1 Application(s) Topical every 6 hours PRN Sore Nipples  magnesium hydroxide Suspension 30 milliLiter(s) Oral two times a day PRN Constipation  nalbuphine Injectable 2.5 milliGRAM(s) IV Push every 6 hours PRN Pruritus  naloxone Injectable 0.1 milliGRAM(s) IV Push every 3 minutes PRN For ANY of the following changes in patient status:  A. Breaths Per Minute LESS THAN 10, B. Oxygen saturation LESS THAN 90%, C. Sedation score of 6 for Stop After: 4 Times  ondansetron Injectable 4 milliGRAM(s) IV Push every 6 hours PRN Nausea  oxyCODONE    IR 5 milliGRAM(s) Oral every 3 hours PRN Mild Pain (1 - 3)  oxyCODONE    IR 5 milliGRAM(s) Oral every 3 hours PRN Moderate to Severe Pain (4-10)  oxyCODONE    IR 5 milliGRAM(s) Oral once PRN Moderate to Severe Pain (4-10)  simethicone 80 milliGRAM(s) Chew every 4 hours PRN Gas      OBJECTIVE:    Sedation:        	[X] Alert	 [ ] Drowsy	[ ] Arousable      [ ] Asleep       [ ] Unresponsive    Side Effects:	[  ] None 	[ ] Nausea	[ ] Vomiting         [ X] Pruritus  		[ ] Weakness            [ ] Numbness	          [ ] Other:    Vital Signs Last 24 Hrs  T(C): 36.7 (29 Jul 2021 05:40), Max: 36.9 (28 Jul 2021 15:04)  T(F): 98 (29 Jul 2021 05:40), Max: 98.5 (28 Jul 2021 23:30)  HR: 84 (29 Jul 2021 05:40) (84 - 104)  BP: 94/61 (29 Jul 2021 05:40) (94/61 - 128/82)  BP(mean): --  RR: 18 (29 Jul 2021 05:40) (18 - 22)  SpO2: 95% (29 Jul 2021 05:40) (94% - 98%)    ASSESSMENT/ PLAN  [X] Patient to be transitioned to prn analgesics after 24 hours  [X] Pain management per primary service, pain service to sign off   [X]Documentation and Verification of current medications
Postpartum Note-  Section POD#2      Rubella IgG:      Immune                RPR:                 Negative      Blood Type:      O+    S:Patient is a  41y G 3   P 3   POD#2 S/P C/Sec  Subjective: Patient w/o complaints, pain is controlled.  Pt is OOB, tolerating PO, passing flatus, and voiding. Lochia WNL.   Feeding: Breastfeeding    O:  Vital Signs Last 24 Hrs  T(C): 36.7 (2021 04:44), Max: 36.8 (2021 09:05)  T(F): 98.1 (2021 04:44), Max: 98.2 (2021 09:05)  HR: 85 (2021 04:44) (80 - 93)  BP: 110/71 (2021 04:44) (100/68 - 115/78)  BP(mean): --  RR: 17 (2021 04:44) (17 - 18)  SpO2: 95% (2021 04:44) (95% - 99%)      Gen: NAD  CV: rrr s1s2, CTABL  Abdomen: Soft, nontender, non distended, fundus firm.  Bowel Sounds x 4 quadrants  Incision: Clean, dry, and intact.  Negative erythema/edema/ecchymosis.   SubQ  Lochia WNL  Ext: Neg edema, Neg calf tenderness.  Pedal pulses palpated B/L    LABS:                          11.4   8.01  )-----------( 129      ( 2021 06:11 )             33.3       A/P:  41y  POD # 2 S/P  repeat  section, doing well    PMHx:  36w pltcs arrest of descent 8#7  Current Issues: none    Increase OOB  PO Pain Protocol  Continue Regular Diet  Continue Routine Postop/Postpartum Care          
Day 1 of Anesthesia Pain Management Service    SUBJECTIVE:  Pain Scale Score:          [X] Refer to charted pain scores    THERAPY: Received PF spinal morphine as above    OBJECTIVE:    Sedation:        	[X] Alert	[ ] Drowsy	[ ] Arousable      [ ] Asleep       [ ] Unresponsive    Side Effects:	[X] None	[ ] Nausea	[ ] Vomiting         [ ] Pruritus  		[ ] Weakness            [ ] Numbness	          [ ] Other:    ASSESSMENT/ PLAN  [X] Patient transitioned to prn analgesics  [X] Pain management per primary service, pain service to sign off   [X]Documentation and Verification of current medications
PA POD # 1 C/S Note    Blood Type:   A Positive              RPR:Negative      Pain:  Controlled.  Complaints:  None  Pt. is ambulating, DTV, passing flatus-small amt. & still feels "gassy", & tolerating regular diet.  Lochia:  WNL    VS:  T(C): 36.7 (07-29-21 @ 05:40), Max: 36.9 (07-28-21 @ 15:04)  HR: 84 (07-29-21 @ 05:40) (84 - 104)  BP: 94/61 (07-29-21 @ 05:40) (94/61 - 128/82)  RR: 18 (07-29-21 @ 05:40) (18 - 22)  SpO2: 95% (07-29-21 @ 05:40) (94% - 98%)                        11.4   8.01  )-----------( 129      ( 29 Jul 2021 06:11 )             33.3     Complete Blood Count (07.15.21 @ 16:59)    WBC Count: 8.20 K/uL     Hemoglobin: 13.1 g/dL    Hematocrit: 38.3 %     Platelet Count - Automated: 166 K/uL    Abd:  Soft, softly-distended, non-tender            Fundus-firm            Incision- C/D/I-SQ/Prineo  Extremities:  Edema - none                     Calf Tenderness - none    Assessment:    41 y.o. G 3 P  3003     POD # 1 S/P Rpt. C/S with BTL in stable condition.    PMHx significant for:  gest. Hypothyroidism  Current Issues:  Gaseous distention  Plan:  Increase OOB             Cont. Pain Protocol             CBC as above             Cont. Reg. Diet             Cont. PO Care.            Cont. DVT PPx            Encouraged increased ambulation & simethicone prn    CARMINE Oliva

## 2021-08-02 LAB — SURGICAL PATHOLOGY STUDY: SIGNIFICANT CHANGE UP

## 2021-11-18 RX ORDER — ASPIRIN/CALCIUM CARB/MAGNESIUM 324 MG
1 TABLET ORAL
Qty: 0 | Refills: 0 | DISCHARGE

## 2021-11-18 RX ORDER — LEVOTHYROXINE SODIUM 125 MCG
1 TABLET ORAL
Qty: 0 | Refills: 0 | DISCHARGE

## 2022-02-16 ENCOUNTER — RESULT REVIEW (OUTPATIENT)
Age: 42
End: 2022-02-16

## 2022-02-16 PROBLEM — U07.1 COVID-19: Chronic | Status: ACTIVE | Noted: 2021-07-12

## 2022-02-16 PROBLEM — E03.8 OTHER SPECIFIED HYPOTHYROIDISM: Chronic | Status: ACTIVE | Noted: 2021-07-12

## 2022-03-12 ENCOUNTER — NON-APPOINTMENT (OUTPATIENT)
Age: 42
End: 2022-03-12

## 2022-03-15 ENCOUNTER — NON-APPOINTMENT (OUTPATIENT)
Age: 42
End: 2022-03-15

## 2022-03-16 ENCOUNTER — APPOINTMENT (OUTPATIENT)
Dept: SURGERY | Facility: CLINIC | Age: 42
End: 2022-03-16
Payer: COMMERCIAL

## 2022-03-16 VITALS
SYSTOLIC BLOOD PRESSURE: 111 MMHG | HEART RATE: 98 BPM | HEIGHT: 62 IN | DIASTOLIC BLOOD PRESSURE: 73 MMHG | BODY MASS INDEX: 27.05 KG/M2 | OXYGEN SATURATION: 98 % | TEMPERATURE: 98.3 F | WEIGHT: 147 LBS

## 2022-03-16 PROCEDURE — 99243 OFF/OP CNSLTJ NEW/EST LOW 30: CPT

## 2022-03-24 ENCOUNTER — RESULT REVIEW (OUTPATIENT)
Age: 42
End: 2022-03-24

## 2022-03-24 ENCOUNTER — APPOINTMENT (OUTPATIENT)
Dept: MAMMOGRAPHY | Facility: CLINIC | Age: 42
End: 2022-03-24
Payer: COMMERCIAL

## 2022-03-24 ENCOUNTER — APPOINTMENT (OUTPATIENT)
Dept: ULTRASOUND IMAGING | Facility: CLINIC | Age: 42
End: 2022-03-24

## 2022-03-24 ENCOUNTER — OUTPATIENT (OUTPATIENT)
Dept: OUTPATIENT SERVICES | Facility: HOSPITAL | Age: 42
LOS: 1 days | End: 2022-03-24
Payer: COMMERCIAL

## 2022-03-24 DIAGNOSIS — Z00.8 ENCOUNTER FOR OTHER GENERAL EXAMINATION: ICD-10-CM

## 2022-03-24 PROCEDURE — 76641 ULTRASOUND BREAST COMPLETE: CPT | Mod: 26,50

## 2022-03-24 PROCEDURE — 77066 DX MAMMO INCL CAD BI: CPT

## 2022-03-24 PROCEDURE — 77062 BREAST TOMOSYNTHESIS BI: CPT | Mod: 26

## 2022-03-24 PROCEDURE — 77066 DX MAMMO INCL CAD BI: CPT | Mod: 26

## 2022-03-24 PROCEDURE — G0279: CPT

## 2022-03-24 PROCEDURE — 76641 ULTRASOUND BREAST COMPLETE: CPT

## 2022-09-07 ENCOUNTER — RESULT REVIEW (OUTPATIENT)
Age: 42
End: 2022-09-07

## 2022-09-07 ENCOUNTER — APPOINTMENT (OUTPATIENT)
Dept: ULTRASOUND IMAGING | Facility: CLINIC | Age: 42
End: 2022-09-07

## 2022-09-07 ENCOUNTER — OUTPATIENT (OUTPATIENT)
Dept: OUTPATIENT SERVICES | Facility: HOSPITAL | Age: 42
LOS: 1 days | End: 2022-09-07
Payer: COMMERCIAL

## 2022-09-07 DIAGNOSIS — Z00.8 ENCOUNTER FOR OTHER GENERAL EXAMINATION: ICD-10-CM

## 2022-09-07 PROCEDURE — 76641 ULTRASOUND BREAST COMPLETE: CPT

## 2022-09-07 PROCEDURE — 76642 ULTRASOUND BREAST LIMITED: CPT | Mod: 26,LT

## 2022-09-07 PROCEDURE — 76642 ULTRASOUND BREAST LIMITED: CPT

## 2023-02-08 NOTE — OB RN DELIVERY SUMMARY - NS_NEWBORNACONDIT_OBGYN_ALL_OB
Amirah Wong is a 56 year old female here for  Chief Complaint   Patient presents with   • Consultation     Reports latex allergy or sensitivity.    Medication verified, no changes.  PCP and Pharmacy verified.    Social History     Tobacco Use   Smoking Status Every Day   • Packs/day: 0.50   • Years: 25.00   • Pack years: 12.50   • Types: Cigarettes   Smokeless Tobacco Never   Tobacco Comments    Smoking every 3 hours      Advance Directives Filed: No    ECOG:   ECOG   ECOG Performance Status        Vitals:    Visit Vitals  Ht 5' 3\" (1.6 m)   Wt 77.4 kg (170 lb 10.2 oz)   LMP 01/01/2023   BMI 30.23 kg/m²       These vital signs are:  Within defined parameters (Per Reference \"Defined Limits Hospital Outpatient Department (HOD)\")    Height: Yes, shoes off.  Ht Readings from Last 1 Encounters:   02/08/23 5' 3\" (1.6 m)     Weight:Yes, shoes off.  Wt Readings from Last 3 Encounters:   02/08/23 77.4 kg (170 lb 10.2 oz)   01/16/23 75.4 kg (166 lb 3.2 oz)   01/12/23 75.3 kg (166 lb 0.1 oz)       BMI: Body mass index is 30.23 kg/m².    REVIEW OF SYSTEMS  GENERAL:  Patient denies headache, fevers, chills, night sweats, excessive fatigue, change in appetite, weight loss, dizziness  ALLERGIC/IMMUNOLOGIC: Verified allergies: Yes  EYES:  Patient denies significant visual difficulties, double vision, blurred vision  ENT/MOUTH: Patient denies problems with hearing, sore throat, sinus drainage, mouth sores  ENDOCRINE:  Patient denies diabetes, thyroid disease, hormone replacement, hot flashes  HEMATOLOGIC/LYMPHATIC: Patient denies easy bruising, bleeding, tender lymph nodes, swollen lymph nodes  BREASTS: Patient denies abnormal masses of breast, nipple discharge, pain  RESPIRATORY:  Patient denies lung pain with breathing, cough, coughing up blood, shortness of breath  CARDIOVASCULAR:  Patient denies anginal chest pain, palpitations, shortness of breath when lying flat, peripheral edema  GASTROINTESTINAL: Patient denies abdominal  pain , nausea, vomiting, diarrhea, GI bleeding, constipation, change in bowel habits, heartburn, sensation of feeling full, difficulty swallowing  : Patient denies abnormal genital masses, blood in the urine, frequency, urgency, burning with urination, hesitancy, incontinence, vaginal bleeding, discharge  MUSCULOSKELETAL:  Patient denies joint pain, bone pain, joint swelling, redness, decreased range of motion  SKIN:  Patient denies chronic rashes, inflammation, ulcerations, skin changes, itching  NEUROLOGIC:  Patient denies loss of balance, areas of focal weakness, abnormal gait, sensory problems, numbness, tingling  PSYCHIATRIC: Patient denies insomnia, depression, anxiety    This patient reported abnormal symptoms that needed immediate verbal communication: No   [place]

## 2023-07-12 ENCOUNTER — RESULT REVIEW (OUTPATIENT)
Age: 43
End: 2023-07-12

## 2023-07-12 ENCOUNTER — APPOINTMENT (OUTPATIENT)
Dept: ULTRASOUND IMAGING | Facility: CLINIC | Age: 43
End: 2023-07-12
Payer: COMMERCIAL

## 2023-07-12 ENCOUNTER — OUTPATIENT (OUTPATIENT)
Dept: OUTPATIENT SERVICES | Facility: HOSPITAL | Age: 43
LOS: 1 days | End: 2023-07-12
Payer: COMMERCIAL

## 2023-07-12 ENCOUNTER — APPOINTMENT (OUTPATIENT)
Dept: MAMMOGRAPHY | Facility: CLINIC | Age: 43
End: 2023-07-12
Payer: COMMERCIAL

## 2023-07-12 DIAGNOSIS — Z00.8 ENCOUNTER FOR OTHER GENERAL EXAMINATION: ICD-10-CM

## 2023-07-12 PROCEDURE — 77067 SCR MAMMO BI INCL CAD: CPT | Mod: 26

## 2023-07-12 PROCEDURE — 76642 ULTRASOUND BREAST LIMITED: CPT | Mod: 26,LT

## 2023-07-12 PROCEDURE — 77063 BREAST TOMOSYNTHESIS BI: CPT | Mod: 26

## 2023-07-12 PROCEDURE — 77067 SCR MAMMO BI INCL CAD: CPT

## 2023-07-12 PROCEDURE — 76642 ULTRASOUND BREAST LIMITED: CPT

## 2023-07-12 PROCEDURE — 77063 BREAST TOMOSYNTHESIS BI: CPT

## 2023-10-09 ENCOUNTER — APPOINTMENT (OUTPATIENT)
Dept: ORTHOPEDIC SURGERY | Facility: CLINIC | Age: 43
End: 2023-10-09
Payer: COMMERCIAL

## 2023-10-09 VITALS
BODY MASS INDEX: 27.6 KG/M2 | HEIGHT: 62 IN | WEIGHT: 150 LBS | DIASTOLIC BLOOD PRESSURE: 70 MMHG | HEART RATE: 90 BPM | SYSTOLIC BLOOD PRESSURE: 122 MMHG

## 2023-10-09 DIAGNOSIS — M77.11 LATERAL EPICONDYLITIS, RIGHT ELBOW: ICD-10-CM

## 2023-10-09 PROCEDURE — 99203 OFFICE O/P NEW LOW 30 MIN: CPT

## 2023-10-09 RX ORDER — MELOXICAM 7.5 MG/1
7.5 TABLET ORAL
Qty: 30 | Refills: 1 | Status: ACTIVE | COMMUNITY
Start: 2023-10-09 | End: 1900-01-01

## 2024-10-18 NOTE — ED PROVIDER NOTE - CARE PLAN
Note Text (......Xxx Chief Complaint.): This diagnosis correlates with the Render Risk Assessment In Note?: no Detail Level: Zone Other (Free Text): Removed by previous dermatologist and was being monitored for re-pigmentation. Principal Discharge DX:	Cough

## 2025-03-07 NOTE — OB PROVIDER H&P - NS_BABIESUTERO_OBGYN_ALL_OB_NU
[Time Spent: ___ minutes] : I have spent [unfilled] minutes of time on the encounter which excludes teaching and separately reported services. 1